# Patient Record
Sex: FEMALE
[De-identification: names, ages, dates, MRNs, and addresses within clinical notes are randomized per-mention and may not be internally consistent; named-entity substitution may affect disease eponyms.]

---

## 2022-07-26 PROBLEM — Z00.00 ENCOUNTER FOR PREVENTIVE HEALTH EXAMINATION: Status: ACTIVE | Noted: 2022-07-26

## 2022-08-01 DIAGNOSIS — M25.561 PAIN IN RIGHT KNEE: ICD-10-CM

## 2022-08-05 ENCOUNTER — APPOINTMENT (OUTPATIENT)
Dept: ORTHOPEDIC SURGERY | Facility: CLINIC | Age: 87
End: 2022-08-05

## 2022-08-05 VITALS — WEIGHT: 125 LBS | HEIGHT: 63 IN | BODY MASS INDEX: 22.15 KG/M2

## 2022-08-05 DIAGNOSIS — M17.11 UNILATERAL PRIMARY OSTEOARTHRITIS, RIGHT KNEE: ICD-10-CM

## 2022-08-05 PROCEDURE — 99204 OFFICE O/P NEW MOD 45 MIN: CPT | Mod: 25

## 2022-08-05 PROCEDURE — 20610 DRAIN/INJ JOINT/BURSA W/O US: CPT | Mod: RT

## 2022-08-05 PROCEDURE — 73562 X-RAY EXAM OF KNEE 3: CPT | Mod: RT

## 2022-08-05 NOTE — PHYSICAL EXAM
[Antalgic] : antalgic [LE] : Sensory: Intact in bilateral lower extremities [ALL] : dorsalis pedis, posterior tibial, femoral, popliteal, and radial 2+ and symmetric bilaterally [de-identified] : On physical examination of the right knee.  There is a mild varus deformity noted especially the patient standing and walking. The skin is clean dry and intact with no erythema the swelling is noted. There is some pain and tenderness in all 3 compartments primarily the patellofemoral as well as medial femoral tibial compartment. She does have excellent range of motion. She is able to fully extend the knee and flex the knee towards 120°. This is on both passive and actively. There is no evidence of ligamentous laxity. No evidence of any muscle atrophy. No evidence of any motor or sensory deficit. Patient has good distal pulses with no calf tenderness. [de-identified] : X-rays of the right knee reveal severe osteoarthritic changes with complete loss of joint space particularly on the medial femoral tibial compartment. There is also flattening of the medial condyle. With marked osteophyte pronation and areas of sclerosis. There is no evidence of any fracture or dislocation.

## 2022-08-05 NOTE — DISCUSSION/SUMMARY
[de-identified] : After thorough history full examination and review of x-rays. It was explained to the patient that she does have osteoarthritic changes. She was explained the different modalities of treatment which include conservative versus surgical intervention. It was explained to the patient in the foreseeable future she will be a candidate for a right total knee replacement. However the patient states that she would like to continue with cortisone/Visco supplementation injections. Therefore today she did receive a cortisone injection in the right knee under sterile conditions. She tolerated the injection well without any complaints. It was explained that she may do the Visco supplementation injection. However through her insurance he needs to be authorized him this may be given if the cortisone does not give her relief. She was advised also continue with an exercise program along with ice packs let's moist heat and anti-inflammatories when needed.\par \par I saw and evaluated the patient. I discussed and reviewed the case details with the PA and agree with the PA's findings and plan as documented in the PA note.\par \par

## 2022-08-05 NOTE — PROCEDURE
[Injection] : Injection [Right] : of the right [Knee Joint] : knee joint [Osteoarthritis] : Osteoarthritis [Patient] : patient [Risk] : risk [Benefits] : benefits [Alternatives] : alternatives [Bleeding] : bleeding [Infection] : infection [Allergic Reaction] : allergic reaction [Verbal Consent Obtained] : verbal consent was obtained prior to the procedure [Alcohol] : Alcohol [Ethyl Chloride Spray] : ethyl chloride spray was used as a topical anesthetic [Medial] : medial [Anterior] : anterior [25] : a 25-gauge [1% Lidocaine___(mL)] : [unfilled] mL of 1% Lidocaine [Methylpred. 40mg/mL___(mL)] : [unfilled] mL of 40mg/mL methylprednisolone [Bandage Applied] : a bandage [Tolerated Well] : The patient tolerated the procedure well

## 2022-08-05 NOTE — REASON FOR VISIT
[Initial Visit] : an initial visit for [Knee Pain] : knee pain [FreeTextEntry2] : Consult right knee pain

## 2022-08-05 NOTE — END OF VISIT
[Time Spent: ___ minutes] : I have spent [unfilled] minutes of time on the encounter. [FreeTextEntry3] : I, Dr. Walter, personally performed the evaluation and management services of this new patient. That E/M includes conducting the initial examination, assessing all  conditions, and establishing the plan of care. Today, MY ACP was here to observe my evaluation and management services of this patient to be followed going forward.\par

## 2022-08-05 NOTE — HISTORY OF PRESENT ILLNESS
[Stable] : stable [9] : a maximum pain level of 9/10 [Walking] : walking [Sitting] : sitting [Standing] : standing [Intermit.] : ~He/She~ states the symptoms seem to be intermittent [de-identified] : Patient is an 86-year-old female who presents today for evaluation regarding her right knee she's had pain for many years and is progressively getting worse. She describes the pain as an on-and-off pain which is worse with strenuous activities including standing walking or even going up and down stairs. She states that she does have discomfort with some stiffness in the past she has seen an orthopedist. We tried to supplementation injections. She states that she felt a good relief for a period of time. The pain has returned. Currently she states it a 9 on a scale of 1-10.